# Patient Record
Sex: MALE | Race: WHITE | Employment: UNEMPLOYED | URBAN - METROPOLITAN AREA
[De-identification: names, ages, dates, MRNs, and addresses within clinical notes are randomized per-mention and may not be internally consistent; named-entity substitution may affect disease eponyms.]

---

## 2020-01-29 ENCOUNTER — APPOINTMENT (OUTPATIENT)
Dept: GENERAL RADIOLOGY | Age: 65
DRG: 197 | End: 2020-01-29
Payer: COMMERCIAL

## 2020-01-29 ENCOUNTER — HOSPITAL ENCOUNTER (INPATIENT)
Age: 65
LOS: 1 days | Discharge: HOME | DRG: 197 | End: 2020-02-01
Attending: EMERGENCY MEDICINE | Admitting: INTERNAL MEDICINE
Payer: COMMERCIAL

## 2020-01-29 ENCOUNTER — OFFICE VISIT (OUTPATIENT)
Dept: PRIMARY CARE CLINIC | Age: 65
End: 2020-01-29
Payer: COMMERCIAL

## 2020-01-29 VITALS
HEART RATE: 76 BPM | TEMPERATURE: 97.5 F | SYSTOLIC BLOOD PRESSURE: 126 MMHG | WEIGHT: 167 LBS | BODY MASS INDEX: 24.66 KG/M2 | DIASTOLIC BLOOD PRESSURE: 84 MMHG | OXYGEN SATURATION: 98 %

## 2020-01-29 PROBLEM — I87.2 CHRONIC VENOUS STASIS DERMATITIS OF BOTH LOWER EXTREMITIES: Status: ACTIVE | Noted: 2020-01-29

## 2020-01-29 PROBLEM — L97.529 SKIN ULCERS OF FOOT, BILATERAL (HCC): Status: ACTIVE | Noted: 2020-01-29

## 2020-01-29 PROBLEM — I89.0 LYMPHEDEMA OF BOTH LOWER EXTREMITIES: Status: ACTIVE | Noted: 2020-01-29

## 2020-01-29 PROBLEM — F17.200 TOBACCO USE DISORDER: Status: ACTIVE | Noted: 2020-01-29

## 2020-01-29 PROBLEM — L97.519 SKIN ULCERS OF FOOT, BILATERAL (HCC): Status: ACTIVE | Noted: 2020-01-29

## 2020-01-29 LAB
A/G RATIO: 1.6 (ref 1.1–2.2)
ALBUMIN SERPL-MCNC: 4 G/DL (ref 3.4–5)
ALP BLD-CCNC: 75 U/L (ref 40–129)
ALT SERPL-CCNC: 12 U/L (ref 10–40)
ANION GAP SERPL CALCULATED.3IONS-SCNC: 10 MMOL/L (ref 3–16)
AST SERPL-CCNC: 20 U/L (ref 15–37)
BASOPHILS ABSOLUTE: 0 K/UL (ref 0–0.2)
BASOPHILS RELATIVE PERCENT: 0.6 %
BILIRUB SERPL-MCNC: 1 MG/DL (ref 0–1)
BUN BLDV-MCNC: 12 MG/DL (ref 7–20)
CALCIUM SERPL-MCNC: 9 MG/DL (ref 8.3–10.6)
CHLORIDE BLD-SCNC: 104 MMOL/L (ref 99–110)
CO2: 25 MMOL/L (ref 21–32)
CREAT SERPL-MCNC: 0.9 MG/DL (ref 0.8–1.3)
EOSINOPHILS ABSOLUTE: 0.3 K/UL (ref 0–0.6)
EOSINOPHILS RELATIVE PERCENT: 4.6 %
GFR AFRICAN AMERICAN: >60
GFR NON-AFRICAN AMERICAN: >60
GLOBULIN: 2.5 G/DL
GLUCOSE BLD-MCNC: 94 MG/DL (ref 70–99)
HCT VFR BLD CALC: 45.9 % (ref 40.5–52.5)
HEMOGLOBIN: 15.7 G/DL (ref 13.5–17.5)
LACTIC ACID: 1.1 MMOL/L (ref 0.4–2)
LYMPHOCYTES ABSOLUTE: 1.2 K/UL (ref 1–5.1)
LYMPHOCYTES RELATIVE PERCENT: 17.8 %
MCH RBC QN AUTO: 31.2 PG (ref 26–34)
MCHC RBC AUTO-ENTMCNC: 34.2 G/DL (ref 31–36)
MCV RBC AUTO: 91.3 FL (ref 80–100)
MONOCYTES ABSOLUTE: 0.7 K/UL (ref 0–1.3)
MONOCYTES RELATIVE PERCENT: 11.3 %
NEUTROPHILS ABSOLUTE: 4.3 K/UL (ref 1.7–7.7)
NEUTROPHILS RELATIVE PERCENT: 65.7 %
PDW BLD-RTO: 14.1 % (ref 12.4–15.4)
PLATELET # BLD: 168 K/UL (ref 135–450)
PMV BLD AUTO: 7.9 FL (ref 5–10.5)
POTASSIUM SERPL-SCNC: 4.3 MMOL/L (ref 3.5–5.1)
PRO-BNP: 96 PG/ML (ref 0–124)
RBC # BLD: 5.03 M/UL (ref 4.2–5.9)
SODIUM BLD-SCNC: 139 MMOL/L (ref 136–145)
TOTAL PROTEIN: 6.5 G/DL (ref 6.4–8.2)
WBC # BLD: 6.5 K/UL (ref 4–11)

## 2020-01-29 PROCEDURE — 99213 OFFICE O/P EST LOW 20 MIN: CPT | Performed by: NURSE PRACTITIONER

## 2020-01-29 PROCEDURE — 80053 COMPREHEN METABOLIC PANEL: CPT

## 2020-01-29 PROCEDURE — G0378 HOSPITAL OBSERVATION PER HR: HCPCS

## 2020-01-29 PROCEDURE — 6360000002 HC RX W HCPCS: Performed by: INTERNAL MEDICINE

## 2020-01-29 PROCEDURE — 2580000003 HC RX 258: Performed by: INTERNAL MEDICINE

## 2020-01-29 PROCEDURE — 73630 X-RAY EXAM OF FOOT: CPT

## 2020-01-29 PROCEDURE — 6370000000 HC RX 637 (ALT 250 FOR IP): Performed by: INTERNAL MEDICINE

## 2020-01-29 PROCEDURE — 83605 ASSAY OF LACTIC ACID: CPT

## 2020-01-29 PROCEDURE — 85025 COMPLETE CBC W/AUTO DIFF WBC: CPT

## 2020-01-29 PROCEDURE — 96372 THER/PROPH/DIAG INJ SC/IM: CPT

## 2020-01-29 PROCEDURE — 87040 BLOOD CULTURE FOR BACTERIA: CPT

## 2020-01-29 PROCEDURE — 99285 EMERGENCY DEPT VISIT HI MDM: CPT

## 2020-01-29 PROCEDURE — 83880 ASSAY OF NATRIURETIC PEPTIDE: CPT

## 2020-01-29 RX ORDER — ACETAMINOPHEN 325 MG/1
650 TABLET ORAL EVERY 4 HOURS PRN
Status: DISCONTINUED | OUTPATIENT
Start: 2020-01-29 | End: 2020-02-01 | Stop reason: HOSPADM

## 2020-01-29 RX ORDER — NICOTINE 21 MG/24HR
1 PATCH, TRANSDERMAL 24 HOURS TRANSDERMAL EVERY EVENING
Status: DISCONTINUED | OUTPATIENT
Start: 2020-01-29 | End: 2020-01-30

## 2020-01-29 RX ORDER — SODIUM CHLORIDE 0.9 % (FLUSH) 0.9 %
10 SYRINGE (ML) INJECTION EVERY 12 HOURS SCHEDULED
Status: DISCONTINUED | OUTPATIENT
Start: 2020-01-29 | End: 2020-02-01 | Stop reason: HOSPADM

## 2020-01-29 RX ORDER — SODIUM CHLORIDE 0.9 % (FLUSH) 0.9 %
10 SYRINGE (ML) INJECTION PRN
Status: DISCONTINUED | OUTPATIENT
Start: 2020-01-29 | End: 2020-02-01 | Stop reason: HOSPADM

## 2020-01-29 RX ORDER — ONDANSETRON 2 MG/ML
4 INJECTION INTRAMUSCULAR; INTRAVENOUS EVERY 6 HOURS PRN
Status: DISCONTINUED | OUTPATIENT
Start: 2020-01-29 | End: 2020-02-01 | Stop reason: HOSPADM

## 2020-01-29 RX ADMIN — Medication 10 ML: at 20:43

## 2020-01-29 RX ADMIN — ACETAMINOPHEN 650 MG: 325 TABLET ORAL at 20:41

## 2020-01-29 RX ADMIN — ENOXAPARIN SODIUM 40 MG: 40 INJECTION SUBCUTANEOUS at 20:40

## 2020-01-29 ASSESSMENT — PAIN SCALES - GENERAL
PAINLEVEL_OUTOF10: 3
PAINLEVEL_OUTOF10: 8
PAINLEVEL_OUTOF10: 2
PAINLEVEL_OUTOF10: 4

## 2020-01-29 ASSESSMENT — PAIN DESCRIPTION - FREQUENCY
FREQUENCY: CONTINUOUS

## 2020-01-29 ASSESSMENT — PAIN DESCRIPTION - ONSET
ONSET: ON-GOING

## 2020-01-29 ASSESSMENT — ENCOUNTER SYMPTOMS
COLOR CHANGE: 1
CHEST TIGHTNESS: 0
COUGH: 0
SHORTNESS OF BREATH: 0

## 2020-01-29 ASSESSMENT — PAIN DESCRIPTION - PROGRESSION
CLINICAL_PROGRESSION: NOT CHANGED
CLINICAL_PROGRESSION: GRADUALLY WORSENING
CLINICAL_PROGRESSION: GRADUALLY WORSENING

## 2020-01-29 ASSESSMENT — PAIN DESCRIPTION - PAIN TYPE
TYPE: ACUTE PAIN

## 2020-01-29 ASSESSMENT — PAIN DESCRIPTION - ORIENTATION
ORIENTATION: RIGHT;LEFT

## 2020-01-29 ASSESSMENT — PAIN DESCRIPTION - DESCRIPTORS
DESCRIPTORS: DULL;CONSTANT;THROBBING
DESCRIPTORS: ACHING;CONSTANT;DULL;THROBBING

## 2020-01-29 ASSESSMENT — PAIN DESCRIPTION - LOCATION
LOCATION: FOOT

## 2020-01-29 ASSESSMENT — PAIN - FUNCTIONAL ASSESSMENT
PAIN_FUNCTIONAL_ASSESSMENT: PREVENTS OR INTERFERES SOME ACTIVE ACTIVITIES AND ADLS

## 2020-01-29 NOTE — PATIENT INSTRUCTIONS
1. Pyogenic arthritis of foot, due to unspecified organism, unspecified laterality [facility])    Patient with possible sepsis to feet bilaterally. Recommended immediate transport to the ER for further evaluation due to need of IV antibiotics.    911 dispatched and will transport patient to [facility].

## 2020-01-29 NOTE — H&P
There is mild diffuse soft tissue swelling, without radiopaque foreign body or soft tissue gas. Three views of the left foot were performed. There is no acute fracture or dislocation. No periosteal reaction or osseous destruction is evident. The joint spaces are preserved. There is a small plantar calcaneal spur. There is mild diffuse soft tissue swelling, without radiopaque foreign body or soft tissue gas. Mild diffuse soft tissue swelling of the bilateral feet, without acute osseous abnormality. Namely, there is no radiographic evidence of osteomyelitis. Discussed with ER provider.       Thank you Kyra Ramirez for the opportunity to be involved in this patient's care.    -----------------------------  Lisa Rodriguez MD  [facility]

## 2020-01-29 NOTE — ED NOTES
Bed: B-04  Expected date: 1/29/20  Expected time: 12:34 PM  Means of arrival: [facility]  Comments:  Foot infection     Rony Solomon RN  01/29/20 1099
DC instructions

## 2020-01-29 NOTE — ED PROVIDER NOTES
[address]       Pt Name: Deon Mayorga  MRN: [id number]  [place] 1955  Date of evaluation: 1/29/2020  Provider: LUCA Hernandez    This patient was seen and evaluated by the attending physician Ariel Irene MD.    [address] [place] [address]       Chief Complaint   Patient presents with    Foot Pain     bilateral foot infections sent from clinic       HISTORY OF PRESENT ILLNESS  (Location/Symptom, Timing/Onset, Context/Setting, Quality, Duration, Modifying Factors, Severity.)   Deon Mayorga is a 76 y.o. male who presents to the emergency department with complaint of bilateral foot pain and swelling. Patient says he lives at home by himself, and he has been having problems with his feet increasingly over recent weeks. He says his feet are red and swollen bilaterally, leaking some fluid, and there is a little bit of pain him at rest, but they are more painful with standing and ambulation. He says he is still able to walk around, but his activity is limited now and he is easily tired. Says the pain is significant with ambulation, but eventually his feet go little bit numb after walking. He denies any numbness at rest.  Denies any diabetes or any other known medical problems. Says he takes no prescription medications and does not see a family doctor. He denies chest pain, shortness of breath or cough. Denies any other recent illness or known infections. Nursing Notes were reviewed and I agree. REVIEW OF SYSTEMS    (2-9 systems for level 4, 10 or more for level 5)     Constitutional:  Negative for fever, chills. Respiratory:  Negative for cough, shortness of breath. Cardiovascular:  Negative for chest pain, palpitations. Gastrointestinal:  Negative for nausea, vomiting, abdominal pain. Genitourinary:  Negative for dysuria, hematuria, flank pain, and pelvic pain. Musculoskeletal: Positive for bilateral foot swelling, redness and pain.   Negative X-rays of both feet were negative for any signs of fracture or osteomyelitis. .  Lab work-up showed no notable abnormalities, including a normal white blood cell count, normal lactic acid, normal kidney function, low BNP. Physical exam was suspicious for developing cellulitis in both feet, however, and the patient lives alone and reports difficulty ambulating. Discharged directly to home seems unsafe at this time. Social work was consulted to visit the patient and assess him in the ED, and at this point I turned over care of the patient entirely to Dr. Frank Heart for further care and disposition. PROCEDURES:  None    FINAL IMPRESSION      1. Cellulitis of both feet    2. Impaired mobility and ADLs    3.  Elevated blood pressure reading          DISPOSITION/PLAN   DISPOSITION Admitted 01/29/2020 04:14:05 PM      PATIENT REFERRED TO:  Lupis Mccloud  [address] [place] [address]  [phone]            DISCHARGE MEDICATIONS:  New Prescriptions    No medications on file       (Please note that portions of this note were completed with a voice recognition program.  Efforts were made to edit the dictations but occasionally words are mis-transcribed.)    Mely Turpin, [address] [place] [address]  01/29/20 3375

## 2020-01-29 NOTE — PROGRESS NOTES
Foot Pain    The pain is present in the left lower leg, right lower leg, right ankle, right foot, right toes, left ankle, left foot and left toes. This is a new problem. The current episode started more than 1 month ago. There has been no history of extremity trauma. The problem occurs constantly. The problem has been rapidly worsening. The quality of the pain is described as aching and burning. The pain is moderate. Associated symptoms include joint swelling, a limited range of motion, numbness and stiffness. Pertinent negatives include no fever. The symptoms are aggravated by activity. He has tried OTC ointments for the symptoms. Review of Systems   Constitutional: Negative for activity change, chills and fever. Respiratory: Negative for cough, chest tightness and shortness of breath. Cardiovascular: Negative for chest pain. Musculoskeletal: Positive for arthralgias, gait problem, joint swelling, myalgias and stiffness. Skin: Positive for color change, pallor, rash and wound. Neurological: Positive for numbness. Negative for dizziness, weakness, light-headedness and headaches. Past Medical History  Past Medical History:   Diagnosis Date    Arthritis        Current Medications  Current Outpatient Medications   Medication Sig Dispense Refill    ibuprofen (ADVIL;MOTRIN) 600 MG tablet Take 1 tablet by mouth every 8 hours as needed for Pain 15 tablet 0     No current facility-administered medications for this visit.         Allergies  No Known Allergies    PastSurgical History  Past Surgical History:   Procedure Laterality Date    CATARACT REMOVAL WITH IMPLANT Bilateral        Past Social History  Social History     Socioeconomic History    Marital status: Single     Spouse name: Not on file    Number of children: Not on file    Years of education: Not on file    Highest education level: Not on file   Occupational History    Not on file   Social Needs    Financial resource strain: Not on disease present. Left foot:      Skin integrity: Ulcer, blister, skin breakdown, erythema, warmth and dry skin present. Toenail Condition: Left toenails are abnormally thick. Fungal disease present. Skin:     General: Skin is warm. Coloration: Skin is pale. Findings: Bruising and erythema present. Neurological:      Mental Status: He is alert. Psychiatric:         Mood and Affect: Mood normal.         Behavior: Behavior normal.         Thought Content: Thought content normal.         Assessment       ICD-10-CM    1. Pyogenic arthritis of foot, due to unspecified organism, unspecified laterality ([facility] [address] M00.9        Plan  1. Pyogenic arthritis of foot, due to unspecified organism, unspecified laterality [facility])    Patient with possible sepsis to feet and lower legs bilaterally.  Recommended immediate transport to the ER for further evaluation due to need of IV antibiotics etc.      911 dispatched and will transport patient to [facility].

## 2020-01-30 PROCEDURE — 97166 OT EVAL MOD COMPLEX 45 MIN: CPT

## 2020-01-30 PROCEDURE — 87205 SMEAR GRAM STAIN: CPT

## 2020-01-30 PROCEDURE — 6360000002 HC RX W HCPCS: Performed by: PODIATRIST

## 2020-01-30 PROCEDURE — 94760 N-INVAS EAR/PLS OXIMETRY 1: CPT

## 2020-01-30 PROCEDURE — G0378 HOSPITAL OBSERVATION PER HR: HCPCS

## 2020-01-30 PROCEDURE — 97530 THERAPEUTIC ACTIVITIES: CPT

## 2020-01-30 PROCEDURE — 86403 PARTICLE AGGLUT ANTBDY SCRN: CPT

## 2020-01-30 PROCEDURE — 6370000000 HC RX 637 (ALT 250 FOR IP): Performed by: INTERNAL MEDICINE

## 2020-01-30 PROCEDURE — 6360000002 HC RX W HCPCS: Performed by: INTERNAL MEDICINE

## 2020-01-30 PROCEDURE — 96368 THER/DIAG CONCURRENT INF: CPT

## 2020-01-30 PROCEDURE — 87186 SC STD MICRODIL/AGAR DIL: CPT

## 2020-01-30 PROCEDURE — 87077 CULTURE AEROBIC IDENTIFY: CPT

## 2020-01-30 PROCEDURE — 96366 THER/PROPH/DIAG IV INF ADDON: CPT

## 2020-01-30 PROCEDURE — 96365 THER/PROPH/DIAG IV INF INIT: CPT

## 2020-01-30 PROCEDURE — 6370000000 HC RX 637 (ALT 250 FOR IP): Performed by: PODIATRIST

## 2020-01-30 PROCEDURE — 2580000003 HC RX 258: Performed by: INTERNAL MEDICINE

## 2020-01-30 PROCEDURE — 2580000003 HC RX 258: Performed by: PODIATRIST

## 2020-01-30 PROCEDURE — 87070 CULTURE OTHR SPECIMN AEROBIC: CPT

## 2020-01-30 PROCEDURE — 97162 PT EVAL MOD COMPLEX 30 MIN: CPT

## 2020-01-30 PROCEDURE — 97535 SELF CARE MNGMENT TRAINING: CPT

## 2020-01-30 PROCEDURE — 97116 GAIT TRAINING THERAPY: CPT

## 2020-01-30 PROCEDURE — 96372 THER/PROPH/DIAG INJ SC/IM: CPT

## 2020-01-30 RX ORDER — NICOTINE 21 MG/24HR
1 PATCH, TRANSDERMAL 24 HOURS TRANSDERMAL DAILY
Status: DISCONTINUED | OUTPATIENT
Start: 2020-01-30 | End: 2020-02-01 | Stop reason: HOSPADM

## 2020-01-30 RX ORDER — GENTAMICIN SULFATE 1 MG/G
CREAM TOPICAL 2 TIMES DAILY
Status: DISCONTINUED | OUTPATIENT
Start: 2020-01-30 | End: 2020-02-01 | Stop reason: HOSPADM

## 2020-01-30 RX ADMIN — PIPERACILLIN AND TAZOBACTAM 3.38 G: 3; .375 INJECTION, POWDER, LYOPHILIZED, FOR SOLUTION INTRAVENOUS at 19:54

## 2020-01-30 RX ADMIN — VANCOMYCIN HYDROCHLORIDE 1000 MG: 1 INJECTION, POWDER, LYOPHILIZED, FOR SOLUTION INTRAVENOUS at 17:38

## 2020-01-30 RX ADMIN — GENTAMICIN SULFATE: 1 CREAM TOPICAL at 20:36

## 2020-01-30 RX ADMIN — Medication 10 ML: at 08:22

## 2020-01-30 RX ADMIN — ENOXAPARIN SODIUM 40 MG: 40 INJECTION SUBCUTANEOUS at 20:36

## 2020-01-30 ASSESSMENT — PAIN SCALES - GENERAL
PAINLEVEL_OUTOF10: 0
PAINLEVEL_OUTOF10: 0

## 2020-01-30 NOTE — PROGRESS NOTES
Hospital Medicine Progress Note      Admit Date: 1/29/2020         Overnight Events: No    CC: F/U for BLE cellulitis/lymphedema/chronic venous stasis ulcers    HPI: The patient is a 76 yrs old male, with a past medical history significant for arthritis, lymphedema, and tobacco abuse, who presented to [facility] ER after being seen in his PCPs office for complaints of BLE foot pain/swelling/itching. Reportedly, the patient noted chronic BLE swelling with intermittent improvement - followed by worsening swelling. Additionally, he noted BLE ulcerations. He was transported to [facility] ER via EMS due to concern for pyogenic arthritis. In the ER, the patient was diagnosed with lymphedema with associated venous stasis ulcers/venous stasis dermatitis. He was admitted for further workup and treatment. There was concern that he was not able to care for himself at home. SS was consulted. PT/OT were consulted. Abx were not initiated, as his presentation was most consistent with BLE lymphedema rather than infection. Wound care was consulted for evaluation of chronic venous stasis ulcers. The patient stated upon further discussion that he began to have BLE swelling that would improve every so often and then return several months prior to hospital presentation. While his feet were swollen, he got a new pair of boots. These were not an exact fit and rubbed. One day he took his boot off and realized the inside was soaking wet. That was the beginning of his BLE foot ulcers. Interval History/Subjective: No new complaints. He states that with he BLE elevated - swelling has improved some    Review of Systems:     The patient denied headaches, visual changes, LOC, SOB, CP, ABD pain, N/V/D, new or worsening weakness or neuromuscular deficits. - BLE foot wounds and BLE swelling    Comprehensive ROS negative except as mentioned above.     Past Medical History:        Diagnosis Date    Arthritis        Past Surgical History: Procedure Laterality Date    CATARACT REMOVAL WITH IMPLANT Bilateral        Allergies:  Patient has no known allergies. Past medical and surgical history reviewed. Any changes have been noted. PHYSICAL EXAM:  BP (!) 146/68   Pulse 62   Temp 98 °F (36.7 °C) (Oral)   Resp 16   Ht 5' 9\" (1.753 m)   Wt 166 lb 0.1 oz (75.3 kg)   SpO2 97%   BMI 24.51 kg/m²     No intake or output data in the 24 hours ending 01/30/20 0938      General: Alert and oriented. Resting in bed in no apparent distress. Thin. +2 pitting edema BLE  HEENT: Normocephalic. Atraumatic. Pupils equal and reactive. EOM intact. Oral mucosa pink/moist/intact. Neck: Supple. Symmetrical. Trachea midline. Lungs: Clear to auscultation bilaterally. Respirations even and unlabored. Chest: Exam unremarkable. Cardiac: S1/S2 noted. Regular Rhythm and rate. Abdomen/GI: Soft. Non-tender. Non-distended. BS+. Extremities: PP+. Brisk cap refill. Bilateral dorsal aspects of his feet with large abrasions/wounds. Under his toes also seems to have abrasions/wounds. No drainage from BLE foot wounds. BLE with + 2 pitting edema - venous stasis v lymphedema  Skin: Dry and intact. No lesions noted. Neuro: Grossly intact. No focal deficits noted. LABS:    Lab Results   Component Value Date    WBC 6.5 01/29/2020    HGB 15.7 01/29/2020    HCT 45.9 01/29/2020    MCV 91.3 01/29/2020     01/29/2020    LYMPHOPCT 17.8 01/29/2020    RBC 5.03 01/29/2020    MCH 31.2 01/29/2020    MCHC 34.2 01/29/2020    RDW 14.1 01/29/2020       Lab Results   Component Value Date    CREATININE 0.9 01/29/2020    BUN 12 01/29/2020     01/29/2020    K 4.3 01/29/2020     01/29/2020    CO2 25 01/29/2020       No results found for: MG    Lab Results   Component Value Date    ALT 12 01/29/2020    AST 20 01/29/2020    ALKPHOS 75 01/29/2020    BILITOT 1.0 01/29/2020        No flowsheet data found.     No results found for: LABA1C    Imaging:  XR FOOT LEFT (MIN 3 VIEWS) Preliminary Result   Mild diffuse soft tissue swelling of the bilateral feet, without acute   osseous abnormality. Namely, there is no radiographic evidence of   osteomyelitis. XR FOOT RIGHT (MIN 3 VIEWS)   Preliminary Result   Mild diffuse soft tissue swelling of the bilateral feet, without acute   osseous abnormality. Namely, there is no radiographic evidence of   osteomyelitis. Scheduled and prn Medications:    Scheduled Meds:   nicotine  1 patch Transdermal Daily    sodium chloride flush  10 mL Intravenous 2 times per day    enoxaparin  40 mg Subcutaneous Nightly     Continuous Infusions:  PRN Meds:.sodium chloride flush, magnesium hydroxide, ondansetron, acetaminophen    Assessment & Plan:        BLE lymphedema  Podiatry has been consulted. Appreciate assistance. WC per WC/podiatry rec. Abx per podiatry rec    BLE venous stasis ulcers  See above    Inability to care for himself at home  SS consulted for assistance. PT/OT eval  Most likely he will need continued therapy after discharge    Tobacco abuse  Encourage smoking cessation. Health risks of smoking have been discussed with the patient. Provide supportive care  Nicotine replacement as desired. Body mass index is 24.51 kg/m². The patient and / or the family were informed of the results of any tests, a time was given to answer questions, a plan was proposed and they agreed with plan.     DVT prophylaxis: [x] Lovenox  [] SQ Heparin  [] SCDs because of  [] warfarin/oral direct thrombin inhibitor [] Encourage ambulation    GI prophylaxis: [x] PPI/[id number]  [] not indicated    Probiotic if on abx: [x] Yes [] No [] Not Indicated    Diet: DIET GENERAL;    Consults:  IP CONSULT TO CASE MANAGEMENT  IP CONSULT TO HOSPITALIST      Disposition:  [x] Home  [] Home with home health [] Rehab [] Psych [] SNF  [] LTAC  [] Long term nursing home or group home [] Transfer to ICU  [] Transfer to PCU [] Other:    Code Status: Full Code    ELOS: TBD      Lisa Punch, APRN - NP  01/30/20

## 2020-01-30 NOTE — PLAN OF CARE
Problem: Falls - Risk of:  Goal: Will remain free from falls  Description  Will remain free from falls  1/30/2020 0736 by Dawn Astudillo RN  Outcome: Ongoing  1/29/2020 2154 by Nell Malhotra RN  Outcome: Ongoing  Goal: Absence of physical injury  Description  Absence of physical injury  1/30/2020 0736 by Dawn Astudillo RN  Outcome: Ongoing  1/29/2020 2154 by Nell Malhotra RN  Outcome: Ongoing     Problem: Pain:  Goal: Pain level will decrease  Description  Pain level will decrease  1/30/2020 0736 by Dawn Astudillo RN  Outcome: Ongoing  1/29/2020 2154 by Nell Malhotra RN  Outcome: Ongoing  Goal: Control of acute pain  Description  Control of acute pain  1/30/2020 0736 by Dawn Astudillo RN  Outcome: Ongoing  1/29/2020 2154 by Nell Malhotra RN  Outcome: Ongoing  Goal: Control of chronic pain  Description  Control of chronic pain  1/30/2020 0736 by Dawn Astudillo RN  Outcome: Ongoing  1/29/2020 2154 by Nell Malhotra RN  Outcome: Ongoing
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
## 2020-01-30 NOTE — CONSULTS
Department of Podiatry  Attending Consult Note      Reason for Consult:  LE cellulitis and Ulcer     CHIEF COMPLAINT:  LE Cellulitis and Ulcers     HISTORY OF PRESENT ILLNESS:                The patient is a 76 y.o. male  - Hx of lower extremity edema and foot ulcerations. Presents to [id number] [place] [address] ED after seen in PCP office for pathology. Admission from the ED, no clear picture the chronicity of the condition or previous treatments. At visit, no distress, denies n/f/c/v    Past Medical History:        Diagnosis Date    Arthritis      Past Surgical History:        Procedure Laterality Date    CATARACT REMOVAL WITH IMPLANT Bilateral      Current Medications:    Current Facility-Administered Medications: nicotine (NICODERM CQ) 21 MG/24HR 1 patch, 1 patch, Transdermal, Daily  sodium chloride flush 0.9 % injection 10 mL, 10 mL, Intravenous, 2 times per day  sodium chloride flush 0.9 % injection 10 mL, 10 mL, Intravenous, PRN  magnesium hydroxide (MILK OF MAGNESIA) 400 MG/5ML suspension 30 mL, 30 mL, Oral, Daily PRN  ondansetron (ZOFRAN) injection 4 mg, 4 mg, Intravenous, Q6H PRN  enoxaparin (LOVENOX) injection 40 mg, 40 mg, Subcutaneous, Nightly  acetaminophen (TYLENOL) tablet 650 mg, 650 mg, Oral, Q4H PRN  Allergies:  Patient has no known allergies. Social History:    ACTIVITIES OF DAILY LIVING:  Patient is able to perform all activities of daily living. Family History:       Family history unknown: Yes     REVIEW OF SYSTEMS:    CONSTITUTIONAL:  negative  PHYSICAL EXAM:      Vitals:    BP (!) 162/77   Pulse 59   Temp 98.2 °F (36.8 °C) (Oral)   Resp 18   Ht 5' 9\" (1.753 m)   Wt 166 lb 0.1 oz (75.3 kg)   SpO2 98%   BMI 24.51 kg/m²     PHYSICAL EXAM:   No intake or output data in the 24 hours ending 01/30/20 0938     *General: Alert and oriented. Resting in bed in no apparent distress.   Extremities - see below      LABS:           Lab Results   Component Value Date     WBC 6.5 01/29/2020     HGB 15.7 01/29/2020     lower extremity  3. Ulcerations dorsal feet bilaterally - venous etiologic  4. Erythema/Cellulitis lower extremity      RECOMMENDATIONS:    1. E/M 45 min - at least 50% of the time spent discussion and treatment plans for the patient. 2. Reviewed medical records along with discussion with nursing team and patient    3. Dressings for bilateral lower extremity -   - Gentamicin cream over dorsal ulcer sites  - adaptic  - 4x8, kerlix  - Ace from base of toes to below the knee  - Change q am and q pm  - Cx will be ordered prior to first dressing application - however can be affected by abundant skin candelario    4. Would benefit from ABX - perhaps a couple days of IV an convert to po prn needs. - IV zosyn/Vanc combo    5. Blood cx negative to date    6. Following while in house    7.  Other imagine, studies, tests prn needs or pathologic proliferation    Aditya Mullins 630, DPM   [facility]  [phone] - office  [phone] - pager

## 2020-01-31 PROBLEM — L97.929 VENOUS STASIS ULCERS OF BOTH LOWER EXTREMITIES (HCC): Status: ACTIVE | Noted: 2020-01-31

## 2020-01-31 PROBLEM — I83.029 VENOUS STASIS ULCERS OF BOTH LOWER EXTREMITIES (HCC): Status: ACTIVE | Noted: 2020-01-31

## 2020-01-31 PROBLEM — L97.919 VENOUS STASIS ULCERS OF BOTH LOWER EXTREMITIES (HCC): Status: ACTIVE | Noted: 2020-01-31

## 2020-01-31 PROBLEM — I83.019 VENOUS STASIS ULCERS OF BOTH LOWER EXTREMITIES (HCC): Status: ACTIVE | Noted: 2020-01-31

## 2020-01-31 LAB
ANION GAP SERPL CALCULATED.3IONS-SCNC: 11 MMOL/L (ref 3–16)
BASOPHILS ABSOLUTE: 0 K/UL (ref 0–0.2)
BASOPHILS RELATIVE PERCENT: 0.4 %
BUN BLDV-MCNC: 13 MG/DL (ref 7–20)
CALCIUM SERPL-MCNC: 8.4 MG/DL (ref 8.3–10.6)
CHLORIDE BLD-SCNC: 103 MMOL/L (ref 99–110)
CO2: 23 MMOL/L (ref 21–32)
CREAT SERPL-MCNC: 1.1 MG/DL (ref 0.8–1.3)
EOSINOPHILS ABSOLUTE: 0.2 K/UL (ref 0–0.6)
EOSINOPHILS RELATIVE PERCENT: 3.4 %
GFR AFRICAN AMERICAN: >60
GFR NON-AFRICAN AMERICAN: >60
GLUCOSE BLD-MCNC: 100 MG/DL (ref 70–99)
HCT VFR BLD CALC: 43.1 % (ref 40.5–52.5)
HEMOGLOBIN: 14.9 G/DL (ref 13.5–17.5)
LYMPHOCYTES ABSOLUTE: 1.1 K/UL (ref 1–5.1)
LYMPHOCYTES RELATIVE PERCENT: 15.9 %
MAGNESIUM: 1.8 MG/DL (ref 1.8–2.4)
MCH RBC QN AUTO: 31.5 PG (ref 26–34)
MCHC RBC AUTO-ENTMCNC: 34.5 G/DL (ref 31–36)
MCV RBC AUTO: 91.4 FL (ref 80–100)
MONOCYTES ABSOLUTE: 0.7 K/UL (ref 0–1.3)
MONOCYTES RELATIVE PERCENT: 10.7 %
NEUTROPHILS ABSOLUTE: 4.9 K/UL (ref 1.7–7.7)
NEUTROPHILS RELATIVE PERCENT: 69.6 %
PDW BLD-RTO: 14.1 % (ref 12.4–15.4)
PLATELET # BLD: 147 K/UL (ref 135–450)
PMV BLD AUTO: 7.8 FL (ref 5–10.5)
POTASSIUM SERPL-SCNC: 4 MMOL/L (ref 3.5–5.1)
RBC # BLD: 4.72 M/UL (ref 4.2–5.9)
SODIUM BLD-SCNC: 137 MMOL/L (ref 136–145)
VANCOMYCIN TROUGH: 9.6 UG/ML (ref 10–20)
WBC # BLD: 7 K/UL (ref 4–11)

## 2020-01-31 PROCEDURE — G0378 HOSPITAL OBSERVATION PER HR: HCPCS

## 2020-01-31 PROCEDURE — 97530 THERAPEUTIC ACTIVITIES: CPT

## 2020-01-31 PROCEDURE — 6360000002 HC RX W HCPCS: Performed by: NURSE PRACTITIONER

## 2020-01-31 PROCEDURE — 83735 ASSAY OF MAGNESIUM: CPT

## 2020-01-31 PROCEDURE — 6370000000 HC RX 637 (ALT 250 FOR IP): Performed by: INTERNAL MEDICINE

## 2020-01-31 PROCEDURE — 96372 THER/PROPH/DIAG INJ SC/IM: CPT

## 2020-01-31 PROCEDURE — 2580000003 HC RX 258: Performed by: INTERNAL MEDICINE

## 2020-01-31 PROCEDURE — 80202 ASSAY OF VANCOMYCIN: CPT

## 2020-01-31 PROCEDURE — 94760 N-INVAS EAR/PLS OXIMETRY 1: CPT

## 2020-01-31 PROCEDURE — 2580000003 HC RX 258: Performed by: NURSE PRACTITIONER

## 2020-01-31 PROCEDURE — 6370000000 HC RX 637 (ALT 250 FOR IP): Performed by: NURSE PRACTITIONER

## 2020-01-31 PROCEDURE — 2580000003 HC RX 258: Performed by: PODIATRIST

## 2020-01-31 PROCEDURE — 6360000002 HC RX W HCPCS: Performed by: PODIATRIST

## 2020-01-31 PROCEDURE — 85025 COMPLETE CBC W/AUTO DIFF WBC: CPT

## 2020-01-31 PROCEDURE — 1200000000 HC SEMI PRIVATE

## 2020-01-31 PROCEDURE — 97116 GAIT TRAINING THERAPY: CPT

## 2020-01-31 PROCEDURE — 96366 THER/PROPH/DIAG IV INF ADDON: CPT

## 2020-01-31 PROCEDURE — 6360000002 HC RX W HCPCS: Performed by: INTERNAL MEDICINE

## 2020-01-31 PROCEDURE — 80048 BASIC METABOLIC PNL TOTAL CA: CPT

## 2020-01-31 PROCEDURE — 36415 COLL VENOUS BLD VENIPUNCTURE: CPT

## 2020-01-31 RX ORDER — FAMOTIDINE 20 MG/1
20 TABLET, FILM COATED ORAL 2 TIMES DAILY
Status: DISCONTINUED | OUTPATIENT
Start: 2020-01-31 | End: 2020-02-01 | Stop reason: HOSPADM

## 2020-01-31 RX ORDER — LACTOBACILLUS RHAMNOSUS GG 10B CELL
1 CAPSULE ORAL 2 TIMES DAILY WITH MEALS
Status: DISCONTINUED | OUTPATIENT
Start: 2020-01-31 | End: 2020-02-01 | Stop reason: HOSPADM

## 2020-01-31 RX ADMIN — PIPERACILLIN AND TAZOBACTAM 3.38 G: 3; .375 INJECTION, POWDER, LYOPHILIZED, FOR SOLUTION INTRAVENOUS at 10:51

## 2020-01-31 RX ADMIN — ENOXAPARIN SODIUM 40 MG: 40 INJECTION SUBCUTANEOUS at 22:31

## 2020-01-31 RX ADMIN — VANCOMYCIN HYDROCHLORIDE 1000 MG: 1 INJECTION, POWDER, LYOPHILIZED, FOR SOLUTION INTRAVENOUS at 06:04

## 2020-01-31 RX ADMIN — Medication 1 CAPSULE: at 08:32

## 2020-01-31 RX ADMIN — CEFEPIME HYDROCHLORIDE 2 G: 2 INJECTION, POWDER, FOR SOLUTION INTRAVENOUS at 16:35

## 2020-01-31 RX ADMIN — FAMOTIDINE 20 MG: 20 TABLET, FILM COATED ORAL at 22:31

## 2020-01-31 RX ADMIN — VANCOMYCIN HYDROCHLORIDE 1000 MG: 1 INJECTION, POWDER, LYOPHILIZED, FOR SOLUTION INTRAVENOUS at 17:46

## 2020-01-31 RX ADMIN — GENTAMICIN SULFATE: 1 CREAM TOPICAL at 08:32

## 2020-01-31 RX ADMIN — Medication 1 CAPSULE: at 16:35

## 2020-01-31 RX ADMIN — GENTAMICIN SULFATE: 1 CREAM TOPICAL at 22:32

## 2020-01-31 RX ADMIN — Medication 10 ML: at 08:32

## 2020-01-31 RX ADMIN — PIPERACILLIN AND TAZOBACTAM 3.38 G: 3; .375 INJECTION, POWDER, LYOPHILIZED, FOR SOLUTION INTRAVENOUS at 02:19

## 2020-01-31 RX ADMIN — FAMOTIDINE 20 MG: 20 TABLET, FILM COATED ORAL at 08:32

## 2020-01-31 RX ADMIN — Medication 10 ML: at 22:31

## 2020-01-31 ASSESSMENT — PAIN SCALES - GENERAL
PAINLEVEL_OUTOF10: 0
PAINLEVEL_OUTOF10: 0

## 2020-01-31 NOTE — PLAN OF CARE
Problem: Falls - Risk of:  Goal: Will remain free from falls  Description  Will remain free from falls  Outcome: Ongoing  Goal: Absence of physical injury  Description  Absence of physical injury  Outcome: Ongoing     Problem: Pain:  Goal: Pain level will decrease  Description  Pain level will decrease  Outcome: Ongoing  Goal: Control of acute pain  Description  Control of acute pain  Outcome: Ongoing  Goal: Control of chronic pain  Description  Control of chronic pain  Outcome: Ongoing     Problem: Risk for Impaired Skin Integrity  Goal: Tissue integrity - skin and mucous membranes  Description  Structural intactness and normal physiological function of skin and  mucous membranes.   Outcome: Ongoing

## 2020-01-31 NOTE — CARE COORDINATION
Wound consult noted. Pt already seen by podiatry and orders written. Cont to tx per podiatry orders. Please re-consult if needed.  Mich Lawson, MSN, RN, [facility]

## 2020-01-31 NOTE — PROGRESS NOTES
[facility] Medicine Progress Note      Admit Date: 1/29/2020         Overnight Events: No    CC: F/U for BLE cellulitis/lymphedema/chronic venous stasis ulcers    HPI: The patient is a 76 yrs old male, with a past medical history significant for arthritis, lymphedema, and tobacco abuse, who presented to [facility] ER after being seen in his PCPs office for complaints of BLE foot pain/swelling/itching. Reportedly, the patient noted chronic BLE swelling with intermittent improvement - followed by worsening swelling. Additionally, he noted BLE ulcerations. He was transported to [facility] ER via EMS due to concern for pyogenic arthritis. In the ER, the patient was diagnosed with lymphedema with associated venous stasis ulcers/venous stasis dermatitis. He was admitted for further workup and treatment. There was concern that he was not able to care for himself at home. SS was consulted. PT/OT were consulted. Abx were not initiated, as his presentation was most consistent with BLE lymphedema rather than infection. Wound care was consulted for evaluation of chronic venous stasis ulcers. The patient stated upon further discussion that he began to have BLE swelling that would improve every so often and then return several months prior to hospital presentation. While his feet were swollen, he got a new pair of boots. These were not an exact fit and rubbed. One day he took his boot off and realized the inside was soaking wet. That was the beginning of his BLE foot ulcers. The patient was seen by podiatry and wound cx was sent. It was felt that he would benefit from a few days of IV abx with transition to PO on discharge. Wound care was provided per podiatry rec. Interval History/Subjective: No new complaints. He feels that the swelling in his legs is improving.      Review of Systems:     The patient denied headaches, visual changes, LOC, SOB, CP, ABD pain, N/V/D, new or worsening weakness or neuromuscular deficits. - BLE

## 2020-02-01 VITALS
BODY MASS INDEX: 23.54 KG/M2 | HEIGHT: 69 IN | HEART RATE: 54 BPM | WEIGHT: 158.95 LBS | RESPIRATION RATE: 15 BRPM | OXYGEN SATURATION: 98 % | SYSTOLIC BLOOD PRESSURE: 161 MMHG | DIASTOLIC BLOOD PRESSURE: 84 MMHG | TEMPERATURE: 98.4 F

## 2020-02-01 LAB
ANION GAP SERPL CALCULATED.3IONS-SCNC: 9 MMOL/L (ref 3–16)
BASOPHILS ABSOLUTE: 0 K/UL (ref 0–0.2)
BASOPHILS RELATIVE PERCENT: 0.8 %
BUN BLDV-MCNC: 12 MG/DL (ref 7–20)
CALCIUM SERPL-MCNC: 8.5 MG/DL (ref 8.3–10.6)
CHLORIDE BLD-SCNC: 106 MMOL/L (ref 99–110)
CO2: 24 MMOL/L (ref 21–32)
CREAT SERPL-MCNC: 0.8 MG/DL (ref 0.8–1.3)
EOSINOPHILS ABSOLUTE: 0.2 K/UL (ref 0–0.6)
EOSINOPHILS RELATIVE PERCENT: 4.2 %
GFR AFRICAN AMERICAN: >60
GFR NON-AFRICAN AMERICAN: >60
GLUCOSE BLD-MCNC: 118 MG/DL (ref 70–99)
GRAM STAIN RESULT: ABNORMAL
GRAM STAIN RESULT: ABNORMAL
HCT VFR BLD CALC: 43.6 % (ref 40.5–52.5)
HEMOGLOBIN: 15.1 G/DL (ref 13.5–17.5)
LYMPHOCYTES ABSOLUTE: 1.2 K/UL (ref 1–5.1)
LYMPHOCYTES RELATIVE PERCENT: 23.8 %
MCH RBC QN AUTO: 31.4 PG (ref 26–34)
MCHC RBC AUTO-ENTMCNC: 34.6 G/DL (ref 31–36)
MCV RBC AUTO: 90.9 FL (ref 80–100)
MONOCYTES ABSOLUTE: 0.6 K/UL (ref 0–1.3)
MONOCYTES RELATIVE PERCENT: 10.9 %
NEUTROPHILS ABSOLUTE: 3.1 K/UL (ref 1.7–7.7)
NEUTROPHILS RELATIVE PERCENT: 60.3 %
ORGANISM: ABNORMAL
PDW BLD-RTO: 13.9 % (ref 12.4–15.4)
PLATELET # BLD: 145 K/UL (ref 135–450)
PMV BLD AUTO: 7.5 FL (ref 5–10.5)
POTASSIUM SERPL-SCNC: 4.3 MMOL/L (ref 3.5–5.1)
RBC # BLD: 4.8 M/UL (ref 4.2–5.9)
SODIUM BLD-SCNC: 139 MMOL/L (ref 136–145)
WBC # BLD: 5.2 K/UL (ref 4–11)
WOUND/ABSCESS: ABNORMAL

## 2020-02-01 PROCEDURE — 2580000003 HC RX 258: Performed by: PODIATRIST

## 2020-02-01 PROCEDURE — 6370000000 HC RX 637 (ALT 250 FOR IP): Performed by: INTERNAL MEDICINE

## 2020-02-01 PROCEDURE — 6360000002 HC RX W HCPCS: Performed by: PODIATRIST

## 2020-02-01 PROCEDURE — 85025 COMPLETE CBC W/AUTO DIFF WBC: CPT

## 2020-02-01 PROCEDURE — 6370000000 HC RX 637 (ALT 250 FOR IP): Performed by: NURSE PRACTITIONER

## 2020-02-01 PROCEDURE — 2580000003 HC RX 258: Performed by: INTERNAL MEDICINE

## 2020-02-01 PROCEDURE — 6360000002 HC RX W HCPCS: Performed by: NURSE PRACTITIONER

## 2020-02-01 PROCEDURE — 36415 COLL VENOUS BLD VENIPUNCTURE: CPT

## 2020-02-01 PROCEDURE — 2580000003 HC RX 258: Performed by: NURSE PRACTITIONER

## 2020-02-01 PROCEDURE — 80048 BASIC METABOLIC PNL TOTAL CA: CPT

## 2020-02-01 RX ORDER — DOXYCYCLINE HYCLATE 100 MG
100 TABLET ORAL 2 TIMES DAILY
Qty: 20 TABLET | Refills: 0 | Status: SHIPPED | OUTPATIENT
Start: 2020-02-01 | End: 2020-02-11

## 2020-02-01 RX ORDER — CIPROFLOXACIN 500 MG/1
500 TABLET, FILM COATED ORAL 2 TIMES DAILY
Qty: 20 TABLET | Refills: 0 | Status: SHIPPED | OUTPATIENT
Start: 2020-02-01 | End: 2020-02-11

## 2020-02-01 RX ORDER — LACTOBACILLUS RHAMNOSUS GG 10B CELL
1 CAPSULE ORAL 2 TIMES DAILY WITH MEALS
Qty: 20 CAPSULE | Refills: 0 | Status: SHIPPED | OUTPATIENT
Start: 2020-02-01 | End: 2020-02-11

## 2020-02-01 RX ORDER — NICOTINE 21 MG/24HR
1 PATCH, TRANSDERMAL 24 HOURS TRANSDERMAL DAILY
Qty: 30 PATCH | Refills: 3 | Status: SHIPPED | OUTPATIENT
Start: 2020-02-02

## 2020-02-01 RX ORDER — GENTAMICIN SULFATE 1 MG/G
CREAM TOPICAL
Qty: 1 TUBE | Refills: 0 | Status: SHIPPED | OUTPATIENT
Start: 2020-02-01

## 2020-02-01 RX ADMIN — CEFEPIME HYDROCHLORIDE 2 G: 2 INJECTION, POWDER, FOR SOLUTION INTRAVENOUS at 04:45

## 2020-02-01 RX ADMIN — GENTAMICIN SULFATE: 1 CREAM TOPICAL at 08:45

## 2020-02-01 RX ADMIN — FAMOTIDINE 20 MG: 20 TABLET, FILM COATED ORAL at 08:42

## 2020-02-01 RX ADMIN — Medication 1 CAPSULE: at 08:42

## 2020-02-01 RX ADMIN — Medication 10 ML: at 08:45

## 2020-02-01 RX ADMIN — Medication 1 CAPSULE: at 17:13

## 2020-02-01 RX ADMIN — VANCOMYCIN HYDROCHLORIDE 1000 MG: 1 INJECTION, POWDER, LYOPHILIZED, FOR SOLUTION INTRAVENOUS at 05:47

## 2020-02-01 ASSESSMENT — PAIN SCALES - GENERAL
PAINLEVEL_OUTOF10: 0
PAINLEVEL_OUTOF10: 0

## 2020-02-01 NOTE — PLAN OF CARE
Problem: Falls - Risk of:  Goal: Will remain free from falls  Description  Will remain free from falls  Outcome: Ongoing   Pt free from falls this shift. Fall precautions in place at all times. Call light always within reach. Pt able and agreeable to contact for safety appropriately. Problem: Pain:  Goal: Control of acute pain  Description  Control of acute pain  Outcome: Ongoing   Pain/discomfort being managed with PRN analgesics per MD orders. Pt able to express presence and absence of pain and rate pain appropriately using numerical scale. Problem: Risk for Impaired Skin Integrity  Goal: Tissue integrity - skin and mucous membranes  Description  Structural intactness and normal physiological function of skin and  mucous membranes. Outcome: Ongoing   Skin assessment performed each shift per protocol. Skin care protocol in place. Pt able to turn and reposition every two hours and prn with pillow support.

## 2020-02-01 NOTE — DISCHARGE SUMMARY
Hospital Medicine Discharge Summary      Patient ID: Lamonte Morales      Patient's PCP: Ena Roper    Admit Date: 1/29/2020     Discharge Date: 2/1/20    Admitting Physician: Pia Hammonds MD     Discharge Provider: NICOLE Naranjo NP     Discharge Diagnoses: Active Hospital Problems    Diagnosis Date Noted    Venous stasis ulcers of both lower extremities ([facility] [address] [I83.019, I83.029, L97.919, L97.929] 01/31/2020    Lymphedema of both lower extremities [I89.0] 01/29/2020    Skin ulcers of foot, bilateral ([facility] [address] [L97.519, L97.529] 01/29/2020    Tobacco use disorder [F17.200] 01/29/2020    Chronic venous stasis dermatitis of both lower extremities [I87.2] 01/29/2020     Disposition:  [] Home  [x] Home with home health [] Rehab [] Psych [] SNF  [] LTAC  [] Long term nursing home or group home [] Transfer to ICU  [] Transfer to PCU [] Other:    Discharge Instructions/Follow-up:    Please call and schedule an appointment with your Primary Care Provider Dr. Ena Roper at [phone] for a follow up visit within 1 week. Please f/u with podiatry. Call for appointment. Wound care per podiatry rec. [facility] for [facility] RN. Take medications as prescribed. - If you have questions about your medications and/or changes to your medications, please ask your hospital provider and/or your primary care provider. Return to the emergency room for fever, cough, chest pain or worsening symptoms. PCP/SNF to follow up: As above    Discharge Condition: Stable      Code Status:  Full Code     Activity: activity as tolerated    Diet: DIET GENERAL;     Discharge Medications:     Current Discharge Medication List           Details   gentamicin (GARAMYCIN) 0.1 % cream Apply topically 3 times daily.   Qty: 1 Tube, Refills: 0      lactobacillus (CULTURELLE) capsule Take 1 capsule by mouth 2 times daily (with meals) for 10 days  Qty: 20 capsule, Refills: 0      nicotine (NICODERM CQ) 21 MG/24HR Place 1 to PO on discharge. Wound care was provided per podiatry rec. The patient with bilateral lower extremity swelling did improve during his hospital stay. He was feeling well. He was seen by PT/OT and did note remarkably well in therapies. At this time, the patient is able to discharge to home in stable condition with outpatient follow-up as above. He will be prescribed Cipro and doxy for a period of 10 days. Home health nurse was ordered to help the patient monitor his bilateral lower extremity wounds. Exam:     BP (!) 146/78   Pulse 53   Temp 98.1 °F (36.7 °C) (Oral)   Resp 18   Ht 5' 9\" (1.753 m)   Wt 158 lb 15.2 oz (72.1 kg)   SpO2 94%   BMI 23.47 kg/m²     General: Alert and oriented. Resting in bed in no apparent distress. Thin. 1 - 2 + pitting edema BLE  HEENT: Normocephalic. Atraumatic. Pupils equal and reactive. EOM intact. Oral mucosa pink/moist/intact. Neck: Supple. Symmetrical. Trachea midline. Lungs: Clear to auscultation bilaterally. Respirations even and unlabored. Chest: Exam unremarkable. Cardiac: S1/S2 noted. Regular Rhythm and rate. Abdomen/GI: Soft. Non-tender. Non-distended. BS+. Extremities: PP+. Brisk cap refill. Bilateral dorsal aspects of his feet with large abrasions/wounds. Kerlix and ACE wraps to BLE. Swelling is improving. Skin: Dry and intact. No lesions noted, except as above  Neuro: Grossly intact. No focal deficits noted. Consults:   None  IP CONSULT TO CASE MANAGEMENT  IP CONSULT TO HOSPITALIST  IP CONSULT TO PODIATRY  IP CONSULT TO PHARMACY    Significant Diagnostic Studies:     XR FOOT LEFT (MIN 3 VIEWS)   Final Result   Mild diffuse soft tissue swelling of the bilateral feet, without acute   osseous abnormality. Namely, there is no radiographic evidence of   osteomyelitis. XR FOOT RIGHT (MIN 3 VIEWS)   Final Result   Mild diffuse soft tissue swelling of the bilateral feet, without acute   osseous abnormality.   Namely, there is no radiographic HCT 43.6 02/01/2020     02/01/2020       Renal:    Lab Results   Component Value Date     02/01/2020    K 4.3 02/01/2020     02/01/2020    CO2 24 02/01/2020    BUN 12 02/01/2020    CREATININE 0.8 02/01/2020    CALCIUM 8.5 02/01/2020       No future appointments. Time Spent on discharge is more than 45 minutes in the examination, evaluation, counseling and review of medications and discharge plan. RX monitoring reviewed N/A    Signed:    NICOLE Johnson NP   2/1/2020      Thank you [facility] for the opportunity to be involved in this patient's care. If you have any questions or concerns please feel free to contact me at [phone].

## 2020-02-01 NOTE — DISCHARGE INSTR - COC
Mental Status:  oriented and alert    IV Access:  - None    Nursing Mobility/ADLs:  Walking   Assisted  Transfer  Independent  Bathing  Independent  Dressing  Independent  Toileting  Independent  Feeding  Independent  Med [address]  Independent  Med Delivery   whole    Wound Care Documentation and Therapy:  Wound 01/29/20 Foot Edema and weeping wound (Active)   Wound Venous 1/29/2020  5:30 PM   Dressing Status Clean;Dry; Intact 1/31/2020 10:33 PM   Dressing Changed Changed/New 1/31/2020 10:33 PM   Dressing/Treatment Pharmaceutical agent (see MAR); Ace wrap;Roll gauze;Non adherent 1/31/2020 10:33 PM   Wound Cleansed Rinsed/Irrigated with saline 1/31/2020  8:30 AM   Dressing Change Due 01/31/20 1/31/2020 10:33 PM   Wound Assessment Edema;Drainage 1/31/2020 10:33 PM   Drainage Amount Moderate 1/31/2020 10:33 PM   Drainage Description Clear 1/29/2020  5:30 PM   Odor None 1/31/2020 10:33 PM   Number of days: 2        Elimination:  Continence:   · Bowel: Yes  · Bladder: Yes  Urinary Catheter: None   Colostomy/Ileostomy/Ileal Conduit: No       Date of Last BM: 01/31/20    Intake/Output Summary (Last 24 hours) at 2/1/2020 1308  Last data filed at 2/1/2020 1129  Gross per 24 hour   Intake 900 ml   Output 700 ml   Net 200 ml     I/O last 3 completed shifts: In: 900 [P.O.:900]  Out: 650 [Urine:650]    Safety Concerns: At Risk for Falls    Impairments/Disabilities:      None    Nutrition Therapy:  Current Nutrition Therapy:   - Oral Diet:  General    Routes of Feeding: Oral  Liquids: Thin Liquids  Daily Fluid Restriction: no  Last Modified Barium Swallow with Video (Video Swallowing Test): not done    Treatments at the Time of Hospital Discharge:   Respiratory Treatments: none  Oxygen Therapy:  is not on home oxygen therapy.   Ventilator:    - No ventilator support    Rehab Therapies: none  Weight Bearing Status/Restrictions: No weight bearing restirctions  Other Medical Equipment (for information only, NOT a DME order): none  Other Treatments: none    Patient's personal belongings (please select all that are sent with patient):  Glasses    RN SIGNATURE:  Electronically signed by Lluvia Key RN on 2/1/20 at 3:06 PM    CASE MANAGEMENT/SOCIAL WORK SECTION    Inpatient Status Date: ***    Readmission Risk Assessment Score:  Readmission Risk              Risk of Unplanned Readmission:        7           Discharging to Facility/ Agency   · Name:   · Address:  · Phone:  · Fax:    Dialysis Facility (if applicable)   · Name:  · Address:  · Dialysis Schedule:  · Phone:  · Fax:    / signature: {Esignature:[id number]}    PHYSICIAN SECTION    Prognosis: Fair    Condition at Discharge: Stable    Rehab Potential (if transferring to Rehab): Fair    Recommended Labs or Other Treatments After Discharge:   Please call and schedule an appointment with your Primary Care Provider Dr. Adolfo Anglin at [phone] for a follow up visit within 1 week. Please f/u with podiatry. Call for appointment. Wound care per podiatry rec. [facility] for [facility] RN. Take medications as prescribed. - If you have questions about your medications and/or changes to your medications, please ask your hospital provider and/or your primary care provider. Return to the emergency room for fever, cough, chest pain or worsening symptoms. Physician Certification: I certify the above information and transfer of Kandi Meckel  is necessary for the continuing treatment of the diagnosis listed and that he requires [address] for less 30 days.      Update Admission H&P: No change in H&P    PHYSICIAN SIGNATURE:  Electronically signed by NICOLE Villalobos NP on 2/1/20 at 1:09 Rao Wheat MD

## 2020-02-02 LAB — BLOOD CULTURE, ROUTINE: NORMAL
